# Patient Record
Sex: FEMALE | Race: WHITE | NOT HISPANIC OR LATINO | URBAN - METROPOLITAN AREA
[De-identification: names, ages, dates, MRNs, and addresses within clinical notes are randomized per-mention and may not be internally consistent; named-entity substitution may affect disease eponyms.]

---

## 2017-01-06 ENCOUNTER — ALLSCRIPTS OFFICE VISIT (OUTPATIENT)
Dept: OTHER | Facility: OTHER | Age: 57
End: 2017-01-06

## 2017-01-06 DIAGNOSIS — R10.32 LEFT LOWER QUADRANT PAIN: ICD-10-CM

## 2018-01-09 NOTE — MISCELLANEOUS
Message  Return to work or school:   Van Reynaga is under my professional care  She was seen in my office on 10/18/2016   She is able to return to work on  10/24/2016    She can perform work without limitations  Mulugeta Ward NP        Signatures   Electronically signed by : Alem Franco; Oct 20 2016 12:21PM EST                       (Author)

## 2018-01-14 VITALS
DIASTOLIC BLOOD PRESSURE: 80 MMHG | HEIGHT: 65 IN | HEART RATE: 74 BPM | SYSTOLIC BLOOD PRESSURE: 124 MMHG | WEIGHT: 195.13 LBS | RESPIRATION RATE: 14 BRPM | BODY MASS INDEX: 32.51 KG/M2

## 2018-02-03 DIAGNOSIS — K21.9 GASTROESOPHAGEAL REFLUX DISEASE WITHOUT ESOPHAGITIS: Primary | ICD-10-CM

## 2018-02-04 RX ORDER — OMEPRAZOLE 20 MG/1
CAPSULE, DELAYED RELEASE ORAL
Qty: 90 CAPSULE | Refills: 0 | Status: SHIPPED | OUTPATIENT
Start: 2018-02-04 | End: 2018-04-11 | Stop reason: SDUPTHER

## 2018-02-08 DIAGNOSIS — T78.40XD ALLERGIC DISORDER, SUBSEQUENT ENCOUNTER: Primary | ICD-10-CM

## 2018-02-08 RX ORDER — MONTELUKAST SODIUM 10 MG/1
TABLET ORAL
Qty: 90 TABLET | Refills: 3 | Status: SHIPPED | OUTPATIENT
Start: 2018-02-08

## 2018-03-07 NOTE — PROGRESS NOTES
History of Present Illness    Revaccination   Vaccine Information: Vaccine(s) Given (names): pneumo [id number]  Unable to reach by phone  Spoke with regarding vaccine out of temperature range and risks and benefits of revaccination  Pt called (attempt 1): [phone] sf  Pt called (attempt 2): [phone] sf  Pt called (attempt 3): [phone] sd  Letter Sent (Regular and Certified): [id number] sd  Revaccination Completed: [id number]  Active Problems    1  Abnormal EKG (794 31) (R94 31)   2  Acid indigestion (536 8) (K30)   3  Acute left ankle pain (719 47) (M25 572)   4  Allergic rhinitis (477 9) (J30 9)   5  Allergy to penicillin (V14 0) (Z88 0)   6  Chronic obstructive pulmonary disease (496) (J44 9)   7  Colon cancer screening (V76 51) (Z12 11)   8  Constipation (564 00) (K59 00)   9  Contusion of skin with intact surface (924 9) (T14 8)   10  COPD with exacerbation (491 21) (J44 1)   11  Dental abscess (522 5) (K04 7)   12  Depression with anxiety (300 4) (F41 8)   13  Dysuria (788 1) (R30 0)   14  Encounter for screening mammogram for malignant neoplasm of breast (V76 12)    (Z12 31)   15  Eustachian tube disorder (381 9) (H69 90)   16  Eustachian tube dysfunction (381 81) (H69 80)   17  Flu vaccine need (V04 81) (Z23)   18  Foul smelling urine (791 9) (R82 90)   19  Heartburn (787 1) (R12)   20  Hyperlipidemia (272 4) (E78 5)   21  Impaired fasting glucose (790 21) (R73 01)   22  Laryngitis (464 00) (J04 0)   23  Localized swelling, mass or lump of neck (784 2) (R22 1)   24  Nausea with vomiting (787 01) (R11 2)   25  Neck swelling (784 2) (R22 1)   26  Need for pneumococcal vaccination (V03 82) (Z23)   27  Need for pneumococcal vaccine (V03 82) (Z23)   28  Need for revaccination (V05 9) (Z23)   29  Pharyngitis (462) (J02 9)   30  Toxic effect of tobacco (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 84,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 9) (T63 291A)   31   Vitamin D deficiency (268 9) (E55 9)    Immunizations  Influenza --- [id number]: 21-Nov-2015   PPSV --- Latoyadelon Alarcon: 31-Aug-2014   Tdap --- Latoya Bellamytoma: Unknown   Zoster --- Series1: Never     Current Meds   1  Acetaminophen-Codeine #3 300-30 MG Oral Tablet; take 1 tablet by mouth every 6 hours   as needed for pain   2  Advair Diskus 250-50 MCG/DOSE Inhalation Aerosol Powder Breath Activated; inhale 1   puff twice daily   3  Atorvastatin Calcium 40 MG Oral Tablet; Take 1 tablet daily   4  BuPROPion HCl ER (XL) 300 MG Oral Tablet Extended Release 24 Hour; take one tablet   by mouth every day   5  Ipratropium-Albuterol 0 5-2 5 (3) MG/3ML Inhalation Solution; USE 1 VIAL 4 TIMES A DAY   AND AS NEEDED   6  Levocetirizine Dihydrochloride 5 MG Oral Tablet; take 1 tablet at bedtime if needed   7  Mometasone Furoate 50 MCG/ACT Nasal Suspension; 2 stays each nostril qd   8  Montelukast Sodium 10 MG Oral Tablet; Take 1 tablet daily   9  Naproxen 500 MG Oral Tablet; TAKE 1 TABLET EVERY 12 HOURS DAILY   10  Omeprazole 20 MG Oral Capsule Delayed Release; TAKE 1 CAPSULE DAILY   11  One Touch Ultra (Devices) MISC   12  PredniSONE 10 MG Oral Tablet; take 3 tabs daily x 2 days then 2 tabs daily x 2 days then    1 tab x 1 day   13  ProAir  (90 Base) MCG/ACT Inhalation Aerosol Solution; INHALE 2 PUFFS    EVERY 6 HOURS AS NEEDED   14  Sertraline HCl - 100 MG Oral Tablet; TAKE 1/2 TABLET DAILY   15  Tylenol 8 Hour TBCR   16  Vitamin D3 64322 UNIT Oral Capsule; 1 PO Q WEEK    Allergies    1  Penicillins   2   Quinolones    Signatures   Electronically signed by : NIKIA Tinsley ; Jan 9 2017  6:48PM EST

## 2018-04-09 DIAGNOSIS — E78.49 OTHER HYPERLIPIDEMIA: Primary | ICD-10-CM

## 2018-04-09 RX ORDER — ATORVASTATIN CALCIUM 40 MG/1
TABLET, FILM COATED ORAL
Qty: 90 TABLET | Refills: 1 | Status: SHIPPED | OUTPATIENT
Start: 2018-04-09 | End: 2018-10-06 | Stop reason: SDUPTHER

## 2018-04-11 DIAGNOSIS — K21.9 GASTROESOPHAGEAL REFLUX DISEASE WITHOUT ESOPHAGITIS: ICD-10-CM

## 2018-04-11 DIAGNOSIS — F32.A DEPRESSION, UNSPECIFIED DEPRESSION TYPE: ICD-10-CM

## 2018-04-11 DIAGNOSIS — J45.909 UNCOMPLICATED ASTHMA, UNSPECIFIED ASTHMA SEVERITY, UNSPECIFIED WHETHER PERSISTENT: Primary | ICD-10-CM

## 2018-04-11 RX ORDER — OMEPRAZOLE 20 MG/1
CAPSULE, DELAYED RELEASE ORAL
Qty: 90 CAPSULE | Refills: 0 | Status: SHIPPED | OUTPATIENT
Start: 2018-04-11 | End: 2018-11-19 | Stop reason: SDUPTHER

## 2018-04-12 RX ORDER — BUPROPION HYDROCHLORIDE 300 MG/1
TABLET ORAL
Qty: 90 TABLET | Refills: 2 | Status: SHIPPED | OUTPATIENT
Start: 2018-04-12

## 2018-10-06 DIAGNOSIS — E78.49 OTHER HYPERLIPIDEMIA: ICD-10-CM

## 2018-10-08 RX ORDER — ATORVASTATIN CALCIUM 40 MG/1
TABLET, FILM COATED ORAL
Qty: 90 TABLET | Refills: 1 | Status: SHIPPED | OUTPATIENT
Start: 2018-10-08

## 2018-11-19 DIAGNOSIS — K21.9 GASTROESOPHAGEAL REFLUX DISEASE WITHOUT ESOPHAGITIS: ICD-10-CM

## 2018-11-19 RX ORDER — OMEPRAZOLE 20 MG/1
CAPSULE, DELAYED RELEASE ORAL
Qty: 90 CAPSULE | Refills: 0 | Status: SHIPPED | OUTPATIENT
Start: 2018-11-19

## 2018-11-20 DIAGNOSIS — F32.A DEPRESSION, UNSPECIFIED DEPRESSION TYPE: ICD-10-CM

## 2018-11-20 RX ORDER — BUPROPION HYDROCHLORIDE 300 MG/1
TABLET ORAL
Qty: 90 TABLET | Refills: 2 | OUTPATIENT
Start: 2018-11-20

## 2019-01-07 ENCOUNTER — TELEPHONE (OUTPATIENT)
Dept: INTERNAL MEDICINE CLINIC | Facility: CLINIC | Age: 59
End: 2019-01-07

## 2019-02-04 DIAGNOSIS — T78.40XD ALLERGIC DISORDER, SUBSEQUENT ENCOUNTER: ICD-10-CM

## 2019-02-04 RX ORDER — MONTELUKAST SODIUM 10 MG/1
TABLET ORAL
Qty: 90 TABLET | Refills: 3 | OUTPATIENT
Start: 2019-02-04

## 2019-04-06 DIAGNOSIS — E78.49 OTHER HYPERLIPIDEMIA: ICD-10-CM

## 2019-04-08 RX ORDER — ATORVASTATIN CALCIUM 40 MG/1
TABLET, FILM COATED ORAL
Qty: 90 TABLET | Refills: 1 | OUTPATIENT
Start: 2019-04-08